# Patient Record
Sex: MALE | Race: BLACK OR AFRICAN AMERICAN | NOT HISPANIC OR LATINO
[De-identification: names, ages, dates, MRNs, and addresses within clinical notes are randomized per-mention and may not be internally consistent; named-entity substitution may affect disease eponyms.]

---

## 2019-04-04 ENCOUNTER — FORM ENCOUNTER (OUTPATIENT)
Age: 54
End: 2019-04-04

## 2019-04-04 ENCOUNTER — EMERGENCY (EMERGENCY)
Facility: HOSPITAL | Age: 54
LOS: 1 days | Discharge: HOME | End: 2019-04-04
Attending: EMERGENCY MEDICINE | Admitting: EMERGENCY MEDICINE
Payer: MEDICAID

## 2019-04-04 VITALS
SYSTOLIC BLOOD PRESSURE: 120 MMHG | HEART RATE: 80 BPM | DIASTOLIC BLOOD PRESSURE: 84 MMHG | RESPIRATION RATE: 18 BRPM | OXYGEN SATURATION: 98 % | TEMPERATURE: 98 F | WEIGHT: 154.98 LBS | HEIGHT: 71 IN

## 2019-04-04 DIAGNOSIS — M54.9 DORSALGIA, UNSPECIFIED: ICD-10-CM

## 2019-04-04 DIAGNOSIS — Z98.890 OTHER SPECIFIED POSTPROCEDURAL STATES: Chronic | ICD-10-CM

## 2019-04-04 DIAGNOSIS — M79.662 PAIN IN LEFT LOWER LEG: ICD-10-CM

## 2019-04-04 DIAGNOSIS — R61 GENERALIZED HYPERHIDROSIS: ICD-10-CM

## 2019-04-04 DIAGNOSIS — F17.210 NICOTINE DEPENDENCE, CIGARETTES, UNCOMPLICATED: ICD-10-CM

## 2019-04-04 DIAGNOSIS — R20.2 PARESTHESIA OF SKIN: ICD-10-CM

## 2019-04-04 DIAGNOSIS — M54.5 LOW BACK PAIN: ICD-10-CM

## 2019-04-04 LAB
ALBUMIN SERPL ELPH-MCNC: 4.2 G/DL — SIGNIFICANT CHANGE UP (ref 3.3–5)
ALP SERPL-CCNC: 47 U/L — SIGNIFICANT CHANGE UP (ref 40–120)
ALT FLD-CCNC: 11 U/L — SIGNIFICANT CHANGE UP (ref 10–45)
ANION GAP SERPL CALC-SCNC: 10 MMOL/L — SIGNIFICANT CHANGE UP (ref 5–17)
AST SERPL-CCNC: 18 U/L — SIGNIFICANT CHANGE UP (ref 10–40)
BASOPHILS # BLD AUTO: 0 K/UL — SIGNIFICANT CHANGE UP (ref 0–0.2)
BASOPHILS NFR BLD AUTO: 0 % — SIGNIFICANT CHANGE UP (ref 0–2)
BILIRUB SERPL-MCNC: 0.7 MG/DL — SIGNIFICANT CHANGE UP (ref 0.2–1.2)
BUN SERPL-MCNC: 9 MG/DL — SIGNIFICANT CHANGE UP (ref 7–23)
CALCIUM SERPL-MCNC: 9.4 MG/DL — SIGNIFICANT CHANGE UP (ref 8.4–10.5)
CHLORIDE SERPL-SCNC: 104 MMOL/L — SIGNIFICANT CHANGE UP (ref 96–108)
CO2 SERPL-SCNC: 24 MMOL/L — SIGNIFICANT CHANGE UP (ref 22–31)
CREAT SERPL-MCNC: 0.66 MG/DL — SIGNIFICANT CHANGE UP (ref 0.5–1.3)
EOSINOPHIL # BLD AUTO: 0.39 K/UL — SIGNIFICANT CHANGE UP (ref 0–0.5)
EOSINOPHIL NFR BLD AUTO: 4.6 % — SIGNIFICANT CHANGE UP (ref 0–6)
GLUCOSE SERPL-MCNC: 113 MG/DL — HIGH (ref 70–99)
HBA1C BLD-MCNC: 4.6 % — SIGNIFICANT CHANGE UP (ref 4–5.6)
HCT VFR BLD CALC: 40.5 % — SIGNIFICANT CHANGE UP (ref 39–50)
HGB BLD-MCNC: 12.9 G/DL — LOW (ref 13–17)
LYMPHOCYTES # BLD AUTO: 3.6 K/UL — HIGH (ref 1–3.3)
LYMPHOCYTES # BLD AUTO: 42.7 % — SIGNIFICANT CHANGE UP (ref 13–44)
MAGNESIUM SERPL-MCNC: 1.8 MG/DL — SIGNIFICANT CHANGE UP (ref 1.6–2.6)
MCHC RBC-ENTMCNC: 31.9 GM/DL — LOW (ref 32–36)
MCHC RBC-ENTMCNC: 32 PG — SIGNIFICANT CHANGE UP (ref 27–34)
MCV RBC AUTO: 100.5 FL — HIGH (ref 80–100)
MONOCYTES # BLD AUTO: 1 K/UL — HIGH (ref 0–0.9)
MONOCYTES NFR BLD AUTO: 11.8 % — SIGNIFICANT CHANGE UP (ref 2–14)
NEUTROPHILS # BLD AUTO: 3.06 K/UL — SIGNIFICANT CHANGE UP (ref 1.8–7.4)
NEUTROPHILS NFR BLD AUTO: 34.5 % — LOW (ref 43–77)
PHOSPHATE SERPL-MCNC: 3.9 MG/DL — SIGNIFICANT CHANGE UP (ref 2.5–4.5)
PLATELET # BLD AUTO: 216 K/UL — SIGNIFICANT CHANGE UP (ref 150–400)
POTASSIUM SERPL-MCNC: 4.2 MMOL/L — SIGNIFICANT CHANGE UP (ref 3.5–5.3)
POTASSIUM SERPL-SCNC: 4.2 MMOL/L — SIGNIFICANT CHANGE UP (ref 3.5–5.3)
PROT SERPL-MCNC: 7.3 G/DL — SIGNIFICANT CHANGE UP (ref 6–8.3)
RBC # BLD: 4.03 M/UL — LOW (ref 4.2–5.8)
RBC # FLD: 12.1 % — SIGNIFICANT CHANGE UP (ref 10.3–14.5)
SODIUM SERPL-SCNC: 138 MMOL/L — SIGNIFICANT CHANGE UP (ref 135–145)
WBC # BLD: 8.44 K/UL — SIGNIFICANT CHANGE UP (ref 3.8–10.5)
WBC # FLD AUTO: 8.44 K/UL — SIGNIFICANT CHANGE UP (ref 3.8–10.5)

## 2019-04-04 PROCEDURE — 84100 ASSAY OF PHOSPHORUS: CPT

## 2019-04-04 PROCEDURE — 83036 HEMOGLOBIN GLYCOSYLATED A1C: CPT

## 2019-04-04 PROCEDURE — 72100 X-RAY EXAM L-S SPINE 2/3 VWS: CPT | Mod: 26

## 2019-04-04 PROCEDURE — 99284 EMERGENCY DEPT VISIT MOD MDM: CPT

## 2019-04-04 PROCEDURE — 99284 EMERGENCY DEPT VISIT MOD MDM: CPT | Mod: 25

## 2019-04-04 PROCEDURE — 72100 X-RAY EXAM L-S SPINE 2/3 VWS: CPT

## 2019-04-04 PROCEDURE — 85025 COMPLETE CBC W/AUTO DIFF WBC: CPT

## 2019-04-04 PROCEDURE — 93971 EXTREMITY STUDY: CPT

## 2019-04-04 PROCEDURE — 36415 COLL VENOUS BLD VENIPUNCTURE: CPT

## 2019-04-04 PROCEDURE — 93971 EXTREMITY STUDY: CPT | Mod: 26,LT

## 2019-04-04 PROCEDURE — 96374 THER/PROPH/DIAG INJ IV PUSH: CPT

## 2019-04-04 PROCEDURE — 83735 ASSAY OF MAGNESIUM: CPT

## 2019-04-04 PROCEDURE — 80053 COMPREHEN METABOLIC PANEL: CPT

## 2019-04-04 RX ORDER — LIDOCAINE 4 G/100G
1 CREAM TOPICAL
Qty: 1 | Refills: 0
Start: 2019-04-04 | End: 2019-05-03

## 2019-04-04 RX ORDER — DIAZEPAM 5 MG
5 TABLET ORAL ONCE
Refills: 0 | Status: DISCONTINUED | OUTPATIENT
Start: 2019-04-04 | End: 2019-04-04

## 2019-04-04 RX ORDER — KETOROLAC TROMETHAMINE 30 MG/ML
30 SYRINGE (ML) INJECTION ONCE
Refills: 0 | Status: DISCONTINUED | OUTPATIENT
Start: 2019-04-04 | End: 2019-04-04

## 2019-04-04 RX ADMIN — Medication 30 MILLIGRAM(S): at 21:01

## 2019-04-04 RX ADMIN — Medication 5 MILLIGRAM(S): at 21:01

## 2019-04-04 NOTE — ED PROVIDER NOTE - PHYSICAL EXAMINATION
Constitutional: sitting in chair; NAD  Head: NC/AT  Eyes: PERRL, EOMI, clear conjunctiva  ENT: MMM  Neck: supple; no JVD or thyromegaly  Respiratory: CTA B/L; no W/R/R, no retractions  Cardiac: +S1/S2; RRR; no M/R/G  Gastrointestinal: soft, NT/ND  Back: spine midline, mild TTP left lumbar paraspinal region  Extremities: WWP  Musculoskeletal: NROM x4; no joint swelling, tenderness or erythema  Neurologic: AAOx3; CNII-XII grossly intact; no focal deficits  Psychiatric: affect and characteristics of appearance, verbalizations, behaviors are appropriate

## 2019-04-04 NOTE — ED PROVIDER NOTE - PROGRESS NOTE DETAILS
IV Toradol and PO Valium given for LLE pain 2/2 sciatica, awaiting LS xray, will obtain LE doppler to r/o DVT due to leg swelling Pain improved with toradol and valium. Xray Pain improved with toradol and valium. Xray   LE dopplers negative for DVT.

## 2019-04-04 NOTE — ED ADULT TRIAGE NOTE - ARRIVAL INFO ADDITIONAL COMMENTS
c.o pain and swelling to left hip radiating down to left buttocks and leg for 4 days. denies any injuries but states he has hx of sciatica. States he took two tabs of Tylenol at 4pm with no relief.

## 2019-04-04 NOTE — ED ADULT NURSE NOTE - OBJECTIVE STATEMENT
Patient presents AOX4 c/o of L LE pain x several days--denies injury/trauma--+DIAL, +weight bearing. Denies numbness/tingling. No obvious deformity noted. Denies urinary/bowel incontinence.

## 2019-04-04 NOTE — ED PROVIDER NOTE - NSFOLLOWUPINSTRUCTIONS_ED_ALL_ED_FT
You came in with L leg pain likely due worsening sciatica. Your blood work was normal. Your low back xrays did not show any fracture. You had a sonogram of your left leg and it did not show any blood clots. Your pain improved with pain medications.     Please take the muscle relaxant as prescribed and followup with your primary care doctor in 1 week. We recommend you see the PMD and get a referral to the physical therapist to help with your pain.

## 2019-04-04 NOTE — ED PROVIDER NOTE - CARE PROVIDER_API CALL
Anshul Park)  Orthopedics  [address] [address]  Phone: [phone]  Fax: [phone]  Follow Up Time: Routine

## 2019-04-04 NOTE — ED PROVIDER NOTE - ATTENDING CONTRIBUTION TO CARE
54 yo male with pmh of sciatica with left leg pain x 1 week - no hx of heavy lifting or trauma--no sob or chest pain also painful walking- pain in left buttock radiating down LLE tingling no foot weakness- no rectal pain or dc or bleeding -no skin lesions/ induration or warmth on buttock-  DTRs  2+ R=L no foot drop  nl pulses  LS XRay-  no compression fx- some straightening  likely sciatica given prior hx--no signs of buttock abscess or induration or edema- doppler LLE- no DVT noted LLE  rec lido patch, muscle relaxants, rest  ortho spine re-eval in 3-5 days for re- eval-

## 2019-04-04 NOTE — ED ADULT NURSE NOTE - NSIMPLEMENTINTERV_GEN_ALL_ED
Implemented All Universal Safety Interventions:  [place] to call system. Call bell, personal items and telephone within reach. Instruct patient to call for assistance. Room bathroom lighting operational. Non-slip footwear when patient is off stretcher. Physically safe environment: no spills, clutter or unnecessary equipment. Stretcher in lowest position, wheels locked, appropriate side rails in place.

## 2019-04-04 NOTE — ED PROVIDER NOTE - CLINICAL SUMMARY MEDICAL DECISION MAKING FREE TEXT BOX
Patient with acute on chronic pain of left lower extremity likely due to sciatica. Pain improved with IV Toradol and valium. Xrays w/out any acute findings. LLE doppler Patient with acute on chronic pain of left lower extremity likely due to sciatica. Pain improved with IV Toradol and valium. Xrays w/out any acute findings. LLE doppler negative for DVT. Pt likely due to worsening sciatica. Prescribed muscle relaxant and recommended to followup with PMD for PT referral.

## 2019-04-04 NOTE — ED PROVIDER NOTE - OBJECTIVE STATEMENT
53M w/sciatica 53M, current every day smoker w/out PMH presenting with L-sided back pain. Pt reports he has chronic LBP radiating down his L leg that he usually takes Tylenol or Aleve for daily. Yesterday was an active day for him and he traveled from his home to [facility] and [facility] causing worsening of his LE pain. He reports swelling of his L buttock, L knee, down to his ankle. No trauma or injury to area, denies any weakness or altered sensation.   However, wife reports she noted a "warm" mass no his L buttock which she noticed yesterday. She also reports patient sweat through the sheets overnight last night and a few nights ago. He denies fevers, chills, cough, sob, cp, urinary symptoms, nvd, constipation, changes in bowel habits. 53M, current every day smoker w/PMH of sciatica presenting with L-sided back pain. Pt reports he has chronic LBP radiating down his L leg that he usually takes Tylenol or Aleve for daily. Yesterday was an active day for him and he traveled from his home to [facility] and back causing worsening of his LE pain. He reports swelling of his L buttock, L knee, down to his ankle. No trauma or injury to area, denies any weakness or altered sensation.   However, wife reports she noted a "warm" mass no his L buttock which she noticed yesterday. She also reports patient sweat through the sheets overnight last night and a few nights ago. He denies fevers, chills, cough, sob, cp, urinary symptoms, nvd, constipation, changes in bowel habits.

## 2019-04-05 ENCOUNTER — OUTPATIENT (OUTPATIENT)
Dept: OUTPATIENT SERVICES | Facility: HOSPITAL | Age: 54
LOS: 1 days | End: 2019-04-05
Payer: MEDICAID

## 2019-04-05 ENCOUNTER — RECORD ABSTRACTING (OUTPATIENT)
Age: 54
End: 2019-04-05

## 2019-04-05 ENCOUNTER — APPOINTMENT (OUTPATIENT)
Dept: ORTHOPEDIC SURGERY | Facility: HOSPITAL | Age: 54
End: 2019-04-05
Payer: MEDICAID

## 2019-04-05 VITALS
DIASTOLIC BLOOD PRESSURE: 80 MMHG | BODY MASS INDEX: 21 KG/M2 | HEIGHT: 71 IN | WEIGHT: 150 LBS | SYSTOLIC BLOOD PRESSURE: 130 MMHG

## 2019-04-05 DIAGNOSIS — M51.36 OTHER INTERVERTEBRAL DISC DEGENERATION, LUMBAR REGION: ICD-10-CM

## 2019-04-05 DIAGNOSIS — F17.200 NICOTINE DEPENDENCE, UNSPECIFIED, UNCOMPLICATED: ICD-10-CM

## 2019-04-05 DIAGNOSIS — M54.42 LUMBAGO WITH SCIATICA, LEFT SIDE: ICD-10-CM

## 2019-04-05 DIAGNOSIS — Z98.890 OTHER SPECIFIED POSTPROCEDURAL STATES: Chronic | ICD-10-CM

## 2019-04-05 DIAGNOSIS — G89.29 LUMBAGO WITH SCIATICA, LEFT SIDE: ICD-10-CM

## 2019-04-05 DIAGNOSIS — Z72.3 LACK OF PHYSICAL EXERCISE: ICD-10-CM

## 2019-04-05 DIAGNOSIS — Z78.9 OTHER SPECIFIED HEALTH STATUS: ICD-10-CM

## 2019-04-05 PROBLEM — Z00.00 ENCOUNTER FOR PREVENTIVE HEALTH EXAMINATION: Status: ACTIVE | Noted: 2019-04-05

## 2019-04-05 PROCEDURE — 72084 X-RAY EXAM ENTIRE SPI 6/> VW: CPT

## 2019-04-05 PROCEDURE — 72082 X-RAY EXAM ENTIRE SPI 2/3 VW: CPT

## 2019-04-05 PROCEDURE — 99204 OFFICE O/P NEW MOD 45 MIN: CPT

## 2019-04-05 PROCEDURE — 72100 X-RAY EXAM L-S SPINE 2/3 VWS: CPT

## 2019-04-05 PROCEDURE — 72084 X-RAY EXAM ENTIRE SPI 6/> VW: CPT | Mod: 26

## 2019-04-25 PROBLEM — Z72.3 DOES NOT EXERCISE: Status: ACTIVE | Noted: 2019-04-25

## 2019-04-25 PROBLEM — Z78.9 SOCIAL ALCOHOL USE: Status: ACTIVE | Noted: 2019-04-25

## 2019-04-25 PROBLEM — M51.36 DISC DEGENERATION, LUMBAR: Status: ACTIVE | Noted: 2019-04-25

## 2019-04-25 PROBLEM — F17.200 CURRENT EVERY DAY SMOKER: Status: ACTIVE | Noted: 2019-04-25

## 2019-04-25 PROBLEM — M54.42 CHRONIC BILATERAL LOW BACK PAIN WITH LEFT-SIDED SCIATICA: Status: ACTIVE | Noted: 2019-04-05

## 2019-04-28 NOTE — PHYSICAL EXAM
[de-identified] : General: patient is well developed, well nourished, in no acute \par distress, alert and oriented x 3. \par \par Mood and affect: normal\par \par Respiratory: no respiratory distress noted\par \par Skin: no scars over spine, skin intact, no erythema, increased warmth\par \par Alignment:The spine is well compensated in the coronal and sagittal plane.  There is no asymmetry on the ang forward bend test\par \par Gait: The patient is able to toe walk and heel walk without difficulty. The patient is able to tandem gait without difficulty.\par \par Palpation: no tenderness to palpation spine or paraspinal region\par \par Range of motion: Lumbar spine ROM is restricted\par \par Neurologic Exam:\par Motor: Manual Muscle testing in the lower extremities is 5 out of 5 in all muscle groups. There is no evidence of muscular atrophy in the lower extremities. Sensory: Sensation to light touch is grossly intact in the lower extremities\par \par Reflexes: DTR are present and symmetric throughout, negative almaraz bilaterally, negative inverted radial reflex bilaterally, no clonus, plantar responses are flexor\par \par Hip Exam: No pain with internal or external rotation of hips bilaterally\par \par Special tests: Straight leg raise test negative.  Cross straight leg test negative.  ANNA test negative\par \par Vascular: Examination of the peripheral vascular system demonstrates no evidence of congestion or edema. no evidence of lymphedema bilateral lower extremities, pulses are present and symmetric in both lower extremities.\par \par  [de-identified] : XR thoracolumbar 4/5/19: multilevel degenerative changes, most pronounced at L2/3, no spondylolisthesis or dynamic instability, mild levoscoliosis, no acute fractures

## 2019-04-28 NOTE — HISTORY OF PRESENT ILLNESS
[de-identified] : Referred by ED\par \par Mr. HUBBARD is a very pleasant 53 year old male who complains of low back pain for years, worse over the past month. On initial presentation symptoms were as follows: Patient described the pain as constant.  Patient rated the pain as 6/10 today, average 10/10 this week, 10/10 at its worst.  The pain localized to lower back.  The pain radiates posteriorly down his right leg to his foot.  Patient  denies extremity numbness and paresthesias. The pain is relieved by nothing in particular.  The pain is worsened by activity. Past treatments included pharmacologic management, with mild temporary relief. The patient has not had physical therapy or steroid injections. Pain was so severe that he was seen in ED 4/1/19, pain managed and released.\par \par The patient reports no loss of hand dexterity.\par The patient states there no loss of balance when walking.\par There no sensory loss in the arms or legs\par The patent no difficulty with urination.\par \par The patient no history of previous spine surgery.\par The patient no previous spine consultation.\par \par Pain:\par Back 50 Right Leg 0 Left Leg 50\par \par The patient has no history of unexpected weight loss, no history of active cancer, no history bladder or bowel dysfunction, no night pain, no fevers or chills.\par \par The past medical history, surgical history, family history, allergies, medications, 10+ point review of systems, family history and social history were reviewed and non contributory.\par \par

## 2019-04-28 NOTE — DISCUSSION/SUMMARY
[de-identified] : Discussed the results of the patient's history, physical exam, and imaging. Mr. Butcher has been suffering from low back pain that radiates posteriorly down his left leg for years, worsening over the past month. Physical and neurologic exams are normal. I am recommending an MRI lumbar without contrast for further evaluation. The patient will follow up with me after imaging is completed, sooner if there is an issue.  All questions were answered.\par \par

## 2019-05-12 ENCOUNTER — EMERGENCY (EMERGENCY)
Facility: HOSPITAL | Age: 54
LOS: 1 days | Discharge: HOME | End: 2019-05-12
Attending: EMERGENCY MEDICINE | Admitting: EMERGENCY MEDICINE
Payer: MEDICAID

## 2019-05-12 VITALS
HEART RATE: 80 BPM | OXYGEN SATURATION: 98 % | RESPIRATION RATE: 17 BRPM | TEMPERATURE: 98 F | DIASTOLIC BLOOD PRESSURE: 89 MMHG | SYSTOLIC BLOOD PRESSURE: 176 MMHG

## 2019-05-12 DIAGNOSIS — Z98.890 OTHER SPECIFIED POSTPROCEDURAL STATES: Chronic | ICD-10-CM

## 2019-05-12 DIAGNOSIS — R10.12 LEFT UPPER QUADRANT PAIN: ICD-10-CM

## 2019-05-12 DIAGNOSIS — R10.9 UNSPECIFIED ABDOMINAL PAIN: ICD-10-CM

## 2019-05-12 DIAGNOSIS — Z79.899 OTHER LONG TERM (CURRENT) DRUG THERAPY: ICD-10-CM

## 2019-05-12 LAB
BASOPHILS # BLD AUTO: 0.03 K/UL — SIGNIFICANT CHANGE UP (ref 0–0.2)
BASOPHILS NFR BLD AUTO: 0.3 % — SIGNIFICANT CHANGE UP (ref 0–2)
EOSINOPHIL # BLD AUTO: 0.2 K/UL — SIGNIFICANT CHANGE UP (ref 0–0.5)
EOSINOPHIL NFR BLD AUTO: 2 % — SIGNIFICANT CHANGE UP (ref 0–6)
EXTRA BLUE TOP TUBE: SIGNIFICANT CHANGE UP
EXTRA SST TUBE: SIGNIFICANT CHANGE UP
HCT VFR BLD CALC: 39.9 % — SIGNIFICANT CHANGE UP (ref 39–50)
HGB BLD-MCNC: 12.7 G/DL — LOW (ref 13–17)
IMM GRANULOCYTES NFR BLD AUTO: 0.2 % — SIGNIFICANT CHANGE UP (ref 0–1.5)
LYMPHOCYTES # BLD AUTO: 4.13 K/UL — HIGH (ref 1–3.3)
LYMPHOCYTES # BLD AUTO: 41.7 % — SIGNIFICANT CHANGE UP (ref 13–44)
MCHC RBC-ENTMCNC: 31.8 GM/DL — LOW (ref 32–36)
MCHC RBC-ENTMCNC: 31.9 PG — SIGNIFICANT CHANGE UP (ref 27–34)
MCV RBC AUTO: 100.3 FL — HIGH (ref 80–100)
MONOCYTES # BLD AUTO: 1.1 K/UL — HIGH (ref 0–0.9)
MONOCYTES NFR BLD AUTO: 11.1 % — SIGNIFICANT CHANGE UP (ref 2–14)
NEUTROPHILS # BLD AUTO: 4.43 K/UL — SIGNIFICANT CHANGE UP (ref 1.8–7.4)
NEUTROPHILS NFR BLD AUTO: 44.7 % — SIGNIFICANT CHANGE UP (ref 43–77)
NRBC # BLD: 0 /100 WBCS — SIGNIFICANT CHANGE UP (ref 0–0)
PLATELET # BLD AUTO: 264 K/UL — SIGNIFICANT CHANGE UP (ref 150–400)
RBC # BLD: 3.98 M/UL — LOW (ref 4.2–5.8)
RBC # FLD: 12 % — SIGNIFICANT CHANGE UP (ref 10.3–14.5)
WBC # BLD: 9.91 K/UL — SIGNIFICANT CHANGE UP (ref 3.8–10.5)
WBC # FLD AUTO: 9.91 K/UL — SIGNIFICANT CHANGE UP (ref 3.8–10.5)

## 2019-05-12 PROCEDURE — 99284 EMERGENCY DEPT VISIT MOD MDM: CPT | Mod: 25

## 2019-05-12 NOTE — ED ADULT NURSE NOTE - OBJECTIVE STATEMENT
pt aaox3 c/o LUQ abdominal pain that started 3 days ago but then went away and came back. Pt denies NVD states "it might be a pulled muscle this has happened before"     pt in nad but tender to palpation. Labs sent IV line established. MD mustafa Pending WCTM

## 2019-05-13 VITALS
OXYGEN SATURATION: 98 % | TEMPERATURE: 97 F | HEART RATE: 88 BPM | RESPIRATION RATE: 17 BRPM | DIASTOLIC BLOOD PRESSURE: 80 MMHG | SYSTOLIC BLOOD PRESSURE: 171 MMHG

## 2019-05-13 LAB
ALBUMIN SERPL ELPH-MCNC: 4.3 G/DL — SIGNIFICANT CHANGE UP (ref 3.3–5)
ALP SERPL-CCNC: 52 U/L — SIGNIFICANT CHANGE UP (ref 40–120)
ALT FLD-CCNC: 7 U/L — LOW (ref 10–45)
ANION GAP SERPL CALC-SCNC: 11 MMOL/L — SIGNIFICANT CHANGE UP (ref 5–17)
AST SERPL-CCNC: 16 U/L — SIGNIFICANT CHANGE UP (ref 10–40)
BILIRUB SERPL-MCNC: 0.7 MG/DL — SIGNIFICANT CHANGE UP (ref 0.2–1.2)
BUN SERPL-MCNC: 10 MG/DL — SIGNIFICANT CHANGE UP (ref 7–23)
CALCIUM SERPL-MCNC: 9.1 MG/DL — SIGNIFICANT CHANGE UP (ref 8.4–10.5)
CHLORIDE SERPL-SCNC: 107 MMOL/L — SIGNIFICANT CHANGE UP (ref 96–108)
CK SERPL-CCNC: 287 U/L — HIGH (ref 30–200)
CO2 SERPL-SCNC: 25 MMOL/L — SIGNIFICANT CHANGE UP (ref 22–31)
CREAT SERPL-MCNC: 0.66 MG/DL — SIGNIFICANT CHANGE UP (ref 0.5–1.3)
GLUCOSE SERPL-MCNC: 111 MG/DL — HIGH (ref 70–99)
LIDOCAIN IGE QN: 16 U/L — SIGNIFICANT CHANGE UP (ref 7–60)
POTASSIUM SERPL-MCNC: 4 MMOL/L — SIGNIFICANT CHANGE UP (ref 3.5–5.3)
POTASSIUM SERPL-SCNC: 4 MMOL/L — SIGNIFICANT CHANGE UP (ref 3.5–5.3)
PROT SERPL-MCNC: 7.3 G/DL — SIGNIFICANT CHANGE UP (ref 6–8.3)
SODIUM SERPL-SCNC: 143 MMOL/L — SIGNIFICANT CHANGE UP (ref 135–145)

## 2019-05-13 PROCEDURE — 80053 COMPREHEN METABOLIC PANEL: CPT

## 2019-05-13 PROCEDURE — 87389 HIV-1 AG W/HIV-1&-2 AB AG IA: CPT

## 2019-05-13 PROCEDURE — 36415 COLL VENOUS BLD VENIPUNCTURE: CPT

## 2019-05-13 PROCEDURE — 85025 COMPLETE CBC W/AUTO DIFF WBC: CPT

## 2019-05-13 PROCEDURE — 74177 CT ABD & PELVIS W/CONTRAST: CPT

## 2019-05-13 PROCEDURE — 99284 EMERGENCY DEPT VISIT MOD MDM: CPT | Mod: 25

## 2019-05-13 PROCEDURE — 96374 THER/PROPH/DIAG INJ IV PUSH: CPT | Mod: XU

## 2019-05-13 PROCEDURE — 74177 CT ABD & PELVIS W/CONTRAST: CPT | Mod: 26

## 2019-05-13 PROCEDURE — 96375 TX/PRO/DX INJ NEW DRUG ADDON: CPT

## 2019-05-13 PROCEDURE — 83690 ASSAY OF LIPASE: CPT

## 2019-05-13 PROCEDURE — 82550 ASSAY OF CK (CPK): CPT

## 2019-05-13 RX ORDER — MORPHINE SULFATE 50 MG/1
4 CAPSULE, EXTENDED RELEASE ORAL ONCE
Refills: 0 | Status: DISCONTINUED | OUTPATIENT
Start: 2019-05-13 | End: 2019-05-13

## 2019-05-13 RX ORDER — KETOROLAC TROMETHAMINE 30 MG/ML
30 SYRINGE (ML) INJECTION ONCE
Refills: 0 | Status: DISCONTINUED | OUTPATIENT
Start: 2019-05-13 | End: 2019-05-13

## 2019-05-13 RX ADMIN — MORPHINE SULFATE 4 MILLIGRAM(S): 50 CAPSULE, EXTENDED RELEASE ORAL at 00:46

## 2019-05-13 RX ADMIN — MORPHINE SULFATE 4 MILLIGRAM(S): 50 CAPSULE, EXTENDED RELEASE ORAL at 00:24

## 2019-05-13 RX ADMIN — Medication 30 MILLIGRAM(S): at 00:24

## 2019-05-13 RX ADMIN — Medication 30 MILLIGRAM(S): at 00:45

## 2019-05-13 NOTE — ED PROVIDER NOTE - NSFOLLOWUPINSTRUCTIONS_ED_ALL_ED_FT
Muscle Strain  A muscle strain is an injury that occurs when a muscle is stretched beyond its normal length. Usually, a small number of muscle fibers are torn when this happens. There are three types of muscle strains. First-degree strains have the least amount of muscle fiber tearing and the least amount of pain. Second-degree and third-degree strains have more tearing and pain.    Usually, recovery from muscle strain takes 1–2 weeks. Complete healing normally takes 5–6 weeks.    What are the causes?  This condition is caused when a sudden, violent force is placed on a muscle and stretches it too far. This may occur with a fall, lifting, or sports.    What increases the risk?  This condition is more likely to develop in athletes and people who are physically active.    What are the signs or symptoms?  Symptoms of this condition include:  Pain.  Bruising.  Swelling.  Trouble using the muscle.  How is this diagnosed?  This condition is diagnosed based on a physical exam and your medical history. Tests may also be done, including an X-ray, ultrasound, or MRI.    How is this treated?  This condition is initially treated with PRICE therapy. This therapy involves:  Protecting the muscle from being injured again.  Resting the injured muscle.  Icing the injured muscle.  Applying pressure (compression) to the injured muscle. This may be done with a splint or elastic bandage.  Raising (elevating) the injured muscle.  Your health care provider may also recommend medicine for pain.    Follow these instructions at home:  If you have a splint:     Wear the splint as told by your health care provider. Remove it only as told by your health care provider.   Loosen the splint if your fingers or toes tingle, become numb, or turn cold and blue.  Keep the splint clean.  If the splint is not waterproof:  Do not let it get wet.  Cover it with a watertight covering when you take a bath or a shower.  Managing pain, stiffness, and swelling     Image   If directed, put ice on the injured area.  If you have a removable splint, remove it as told by your health care provider.  Put ice in a plastic bag.  Place a towel between your skin and the bag.  Leave the ice on for 20 minutes, 2–3 times a day.  Move your fingers or toes often to avoid stiffness and to lessen swelling.  Raise (elevate) the injured area above the level of your heart while you are sitting or lying down.  Wear an elastic bandage as told by your health care provider. Make sure that it is not too tight.  General instructions     Take over-the-counter and prescription medicines only as told by your health care provider.  Restrict your activity and rest the injured muscle as told by your health care provider. Gentle movements may be allowed.  If physical therapy was prescribed, do exercises as told by your health care provider.  Do not put pressure on any part of the splint until it is fully hardened. This may take several hours.  Do not use any products that contain nicotine or tobacco, such as cigarettes and e-cigarettes. These can delay bone healing. If you need help quitting, ask your health care provider.  Ask your health care provider when it is safe to drive if you have a splint.  Keep all follow-up visits as told by your health care provider. This is important.  How is this prevented?  Warm up before exercising. This helps to prevent future muscle strains.  Contact a health care provider if:  You have more pain or swelling in the injured area.  Get help right away if:  You have numbness or tingling or lose a lot of strength in the injured area.  Summary  A muscle strain is an injury that occurs when a muscle is stretched beyond its normal length.  This condition is caused when a sudden, violent force is placed on a muscle and stretches it too far.  This condition is initially treated with PRICE therapy, which involves protecting, resting, icing, compressing, and elevating.  Gentle movements may be allowed. If physical therapy was prescribed, do exercises as told by your health care provider.

## 2019-05-13 NOTE — ED PROVIDER NOTE - OBJECTIVE STATEMENT
55yo M hx of sciatica, here for eval of new LUQ abdominal pain x4 days. Started after heavy pushing at work, states works as a  but does a lot of lifting. Pain initially improved then worsened again. worse w/ palpation. never had this before. no associated n/v/d. also w/ his sciatica symptoms but this has been ongoing stable and chronic, has not yet seen pcp for this. no urinary symptoms, no radiation of pain into chest, back, or groin.

## 2019-05-13 NOTE — ED PROVIDER NOTE - CLINICAL SUMMARY MEDICAL DECISION MAKING FREE TEXT BOX
here w/ LUQ pain and tenderness, MSK vs splenic injury given that pt was doing heavy liftig when it started. will check ct abdomen/pelvis w/ IV contrast to assess. if neg, to dc home to f/u pcp. pt and family happy with this plan.

## 2019-07-09 ENCOUNTER — EMERGENCY (EMERGENCY)
Facility: HOSPITAL | Age: 54
LOS: 1 days | Discharge: HOME | End: 2019-07-09
Attending: EMERGENCY MEDICINE | Admitting: EMERGENCY MEDICINE
Payer: MEDICAID

## 2019-07-09 VITALS
TEMPERATURE: 98 F | DIASTOLIC BLOOD PRESSURE: 72 MMHG | OXYGEN SATURATION: 98 % | RESPIRATION RATE: 16 BRPM | SYSTOLIC BLOOD PRESSURE: 139 MMHG | HEART RATE: 72 BPM

## 2019-07-09 VITALS
RESPIRATION RATE: 16 BRPM | SYSTOLIC BLOOD PRESSURE: 186 MMHG | DIASTOLIC BLOOD PRESSURE: 89 MMHG | TEMPERATURE: 98 F | OXYGEN SATURATION: 98 % | HEART RATE: 68 BPM | HEIGHT: 71 IN | WEIGHT: 160.06 LBS

## 2019-07-09 DIAGNOSIS — Z98.890 OTHER SPECIFIED POSTPROCEDURAL STATES: Chronic | ICD-10-CM

## 2019-07-09 DIAGNOSIS — R10.32 LEFT LOWER QUADRANT PAIN: ICD-10-CM

## 2019-07-09 DIAGNOSIS — K52.82 EOSINOPHILIC COLITIS: ICD-10-CM

## 2019-07-09 LAB
ALBUMIN SERPL ELPH-MCNC: 4.5 G/DL — SIGNIFICANT CHANGE UP (ref 3.3–5)
ALP SERPL-CCNC: 55 U/L — SIGNIFICANT CHANGE UP (ref 40–120)
ALT FLD-CCNC: 11 U/L — SIGNIFICANT CHANGE UP (ref 10–45)
ANION GAP SERPL CALC-SCNC: 12 MMOL/L — SIGNIFICANT CHANGE UP (ref 5–17)
APPEARANCE UR: CLEAR — SIGNIFICANT CHANGE UP
AST SERPL-CCNC: 16 U/L — SIGNIFICANT CHANGE UP (ref 10–40)
BASOPHILS # BLD AUTO: 0.03 K/UL — SIGNIFICANT CHANGE UP (ref 0–0.2)
BASOPHILS NFR BLD AUTO: 0.4 % — SIGNIFICANT CHANGE UP (ref 0–2)
BILIRUB SERPL-MCNC: 1.2 MG/DL — SIGNIFICANT CHANGE UP (ref 0.2–1.2)
BILIRUB UR-MCNC: NEGATIVE — SIGNIFICANT CHANGE UP
BUN SERPL-MCNC: 8 MG/DL — SIGNIFICANT CHANGE UP (ref 7–23)
CALCIUM SERPL-MCNC: 9.2 MG/DL — SIGNIFICANT CHANGE UP (ref 8.4–10.5)
CHLORIDE SERPL-SCNC: 105 MMOL/L — SIGNIFICANT CHANGE UP (ref 96–108)
CO2 SERPL-SCNC: 23 MMOL/L — SIGNIFICANT CHANGE UP (ref 22–31)
COLOR SPEC: YELLOW — SIGNIFICANT CHANGE UP
CREAT SERPL-MCNC: 0.62 MG/DL — SIGNIFICANT CHANGE UP (ref 0.5–1.3)
DIFF PNL FLD: ABNORMAL
EOSINOPHIL # BLD AUTO: 0.03 K/UL — SIGNIFICANT CHANGE UP (ref 0–0.5)
EOSINOPHIL NFR BLD AUTO: 0.4 % — SIGNIFICANT CHANGE UP (ref 0–6)
GLUCOSE SERPL-MCNC: 104 MG/DL — HIGH (ref 70–99)
GLUCOSE UR QL: NEGATIVE — SIGNIFICANT CHANGE UP
HCT VFR BLD CALC: 44.6 % — SIGNIFICANT CHANGE UP (ref 39–50)
HGB BLD-MCNC: 14.1 G/DL — SIGNIFICANT CHANGE UP (ref 13–17)
IMM GRANULOCYTES NFR BLD AUTO: 0.4 % — SIGNIFICANT CHANGE UP (ref 0–1.5)
KETONES UR-MCNC: NEGATIVE — SIGNIFICANT CHANGE UP
LACTATE SERPL-SCNC: 1.3 MMOL/L — SIGNIFICANT CHANGE UP (ref 0.5–2)
LEUKOCYTE ESTERASE UR-ACNC: ABNORMAL
LIDOCAIN IGE QN: 9 U/L — SIGNIFICANT CHANGE UP (ref 7–60)
LYMPHOCYTES # BLD AUTO: 2.1 K/UL — SIGNIFICANT CHANGE UP (ref 1–3.3)
LYMPHOCYTES # BLD AUTO: 26.4 % — SIGNIFICANT CHANGE UP (ref 13–44)
MCHC RBC-ENTMCNC: 31.2 PG — SIGNIFICANT CHANGE UP (ref 27–34)
MCHC RBC-ENTMCNC: 31.6 GM/DL — LOW (ref 32–36)
MCV RBC AUTO: 98.7 FL — SIGNIFICANT CHANGE UP (ref 80–100)
MONOCYTES # BLD AUTO: 0.76 K/UL — SIGNIFICANT CHANGE UP (ref 0–0.9)
MONOCYTES NFR BLD AUTO: 9.5 % — SIGNIFICANT CHANGE UP (ref 2–14)
NEUTROPHILS # BLD AUTO: 5.01 K/UL — SIGNIFICANT CHANGE UP (ref 1.8–7.4)
NEUTROPHILS NFR BLD AUTO: 62.9 % — SIGNIFICANT CHANGE UP (ref 43–77)
NITRITE UR-MCNC: NEGATIVE — SIGNIFICANT CHANGE UP
NRBC # BLD: 0 /100 WBCS — SIGNIFICANT CHANGE UP (ref 0–0)
PH UR: 7 — SIGNIFICANT CHANGE UP (ref 5–8)
PLATELET # BLD AUTO: 253 K/UL — SIGNIFICANT CHANGE UP (ref 150–400)
POTASSIUM SERPL-MCNC: 4.1 MMOL/L — SIGNIFICANT CHANGE UP (ref 3.5–5.3)
POTASSIUM SERPL-SCNC: 4.1 MMOL/L — SIGNIFICANT CHANGE UP (ref 3.5–5.3)
PROT SERPL-MCNC: 8.3 G/DL — SIGNIFICANT CHANGE UP (ref 6–8.3)
PROT UR-MCNC: ABNORMAL MG/DL
RBC # BLD: 4.52 M/UL — SIGNIFICANT CHANGE UP (ref 4.2–5.8)
RBC # FLD: 11.9 % — SIGNIFICANT CHANGE UP (ref 10.3–14.5)
SODIUM SERPL-SCNC: 140 MMOL/L — SIGNIFICANT CHANGE UP (ref 135–145)
SP GR SPEC: 1.01 — SIGNIFICANT CHANGE UP (ref 1–1.03)
UROBILINOGEN FLD QL: 1 E.U./DL — SIGNIFICANT CHANGE UP
WBC # BLD: 7.96 K/UL — SIGNIFICANT CHANGE UP (ref 3.8–10.5)
WBC # FLD AUTO: 7.96 K/UL — SIGNIFICANT CHANGE UP (ref 3.8–10.5)

## 2019-07-09 PROCEDURE — 93005 ELECTROCARDIOGRAM TRACING: CPT

## 2019-07-09 PROCEDURE — 93010 ELECTROCARDIOGRAM REPORT: CPT

## 2019-07-09 PROCEDURE — 83690 ASSAY OF LIPASE: CPT

## 2019-07-09 PROCEDURE — 74177 CT ABD & PELVIS W/CONTRAST: CPT | Mod: 26

## 2019-07-09 PROCEDURE — 74177 CT ABD & PELVIS W/CONTRAST: CPT

## 2019-07-09 PROCEDURE — 85025 COMPLETE CBC W/AUTO DIFF WBC: CPT

## 2019-07-09 PROCEDURE — 99284 EMERGENCY DEPT VISIT MOD MDM: CPT | Mod: 25

## 2019-07-09 PROCEDURE — 80053 COMPREHEN METABOLIC PANEL: CPT

## 2019-07-09 PROCEDURE — 81001 URINALYSIS AUTO W/SCOPE: CPT

## 2019-07-09 PROCEDURE — 99284 EMERGENCY DEPT VISIT MOD MDM: CPT

## 2019-07-09 PROCEDURE — 36415 COLL VENOUS BLD VENIPUNCTURE: CPT

## 2019-07-09 PROCEDURE — 83605 ASSAY OF LACTIC ACID: CPT

## 2019-07-09 RX ORDER — IOHEXOL 300 MG/ML
30 INJECTION, SOLUTION INTRAVENOUS ONCE
Refills: 0 | Status: COMPLETED | OUTPATIENT
Start: 2019-07-09 | End: 2019-07-09

## 2019-07-09 RX ADMIN — IOHEXOL 30 MILLILITER(S): 300 INJECTION, SOLUTION INTRAVENOUS at 15:09

## 2019-07-09 NOTE — ED PROVIDER NOTE - EYES, MLM
Clear bilaterally, pupils equal, round and reactive to light. Clear bilaterally, pupils equal, round

## 2019-07-09 NOTE — ED PROVIDER NOTE - NSFOLLOWUPINSTRUCTIONS_ED_ALL_ED_FT
Log Out.    [facility] CareNotes®     :  [facility]             COLITIS - AfterCare(R) Instructions(ER/ED)     Colitis    WHAT YOU NEED TO KNOW:    Colitis is swelling and irritation of your colon. Colitis may be caused by ulcers or a problem with your immune system. Bacteria, a virus, or a parasite may also cause colitis. The cause may not be known. You may have diarrhea, abdominal pain, fever, or blood or mucus in your bowel movement.    DISCHARGE INSTRUCTIONS:    Return to the emergency department if:     You have sudden trouble breathing.       Your bowel movements are black or have blood in them.      You have blood in your vomit.      You have severe abdominal pain or your abdomen is swollen and feels hard.      You have any of the following signs of dehydration:   Dizziness or weakness      Dry mouth, cracked lips, or severe thirst      Fast heartbeat or breathing      Urinating very little or not at all    Contact your healthcare provider if:     Your symptoms get worse or do not go away.      You have a fever, chills, cough, or feel weak and achy.      You suddenly lose weight without trying.       You have questions or concerns about your condition or care.     Medicines:     Medicines may be given to decrease inflammation in your colon and treat diarrhea.      Take your medicine as directed. Contact your healthcare provider if you think your medicine is not helping or if you have side effects. Tell him of her if you are allergic to any medicine. Keep a list of the medicines, vitamins, and herbs you take. Include the amounts, and when and why you take them. Bring the list or the pill bottles to follow-up visits. Carry your medicine list with you in case of an emergency.    Manage your symptoms:     Drink liquids as directed to help prevent dehydration. Good liquids to drink include water, juice, and broth. Ask how much liquid to drink each day. You may need to drink an oral rehydration solution (ORS). An ORS contains a balance of water, salt, and sugar to replace body fluids lost during diarrhea.       Eat a variety of healthy foods. Healthy foods include fruits, vegetables, whole-grain breads, beans, low-fat dairy products, lean meats, and fish. You may need to eat several small meals throughout the day instead of large meals. Avoid spicy foods, caffeine, chocolate, and foods high in fat.      Talk to your healthcare provider before you take NSAIDs. NSAIDs can cause worsen your symptoms if ulcers are causing your colitis.       Start to exercise when you feel better. Regular exercise helps your bowels work normally. Ask about the best exercise plan for you.    Follow up with your healthcare provider as directed: You may need to return for a colonoscopy or other tests. Write down how often you have a bowel movements and what they look like. Bring this to your follow-up visits. Write down your questions so you remember to ask them during your visits.       © Copyright [employer] 2019 All illustrations and images included in CareNotes are the copyrighted property of [employer] Inc[employer] or [employer].      back to top                      © Copyright [employer] 2019

## 2019-07-09 NOTE — ED PROVIDER NOTE - CLINICAL SUMMARY MEDICAL DECISION MAKING FREE TEXT BOX
Patient with LLQ pain afebrile with normal labs. Ct scan shows proctocolitis with no complication. Recommend f/u with GI.

## 2019-07-09 NOTE — ED PROVIDER NOTE - ATTENDING CONTRIBUTION TO CARE
53 yo M hx of HTN not on medications, p/w LLQ pain since midnight last night associated with n/v/d NBNB emesis and diarrhea >5 episodes now with small streaks of blood.  No hx of diverticulitis.  No fever, chills.  Exam nl lungs and chest.  Focal LLQ ttp and mild L CVAT, epigastric region w/ minimal ttp.  No chest pain or sob.  EKG obtained and nonischemic -  chronic LVH.  HTN on exam but otherwise HD stable.  Lactate, lipase and wbc nl.  Will scan to r/o diverticulitis vs colitis.  Do not suspect cardiac cause, dissection or aaa.  Plan await CT scan.

## 2019-07-09 NOTE — ED PROVIDER NOTE - CARE PROVIDER_API CALL
Anshul Paulson)  Gastroenterology; Internal Medicine  [address], [address]  [place] [address]  Phone: [phone]  Fax: [phone]  Follow Up Time:

## 2019-07-09 NOTE — ED PROVIDER NOTE - OBJECTIVE STATEMENT
55 y/o M with PMHx of HTN that is not being managed with medication presents to the ED with complaints of LLQ pain, nausea, vomiting, and diarrhea since last night. Patient has had >5 episodes diarrhea since, and noted blood streaks in last remaining episodes. Denies any fever, travel, hx of diverticulitis, prior surgical hx, urinary symptoms, testicular pain, or other acute complaints.

## 2019-07-09 NOTE — ED ADULT NURSE REASSESSMENT NOTE - NS ED NURSE REASSESS COMMENT FT1
CT scan done, no abdominal or any new symptom complaint, vital signs stable, fluids PO tolerated well, discharged to home in stable condition.
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
## 2019-07-09 NOTE — ED ADULT NURSE REASSESSMENT NOTE - NS ED NURSE REASSESS COMMENT FT1
Patient c/o of abdominal pain w/ episodes of vomitting and diarrhea, none since arrival to ED.  Vital signs stable. Left FA PIV #20 in place, all labs sent, no complications.  NPO observed. CT scan done; results pending.

## 2019-08-23 ENCOUNTER — EMERGENCY (EMERGENCY)
Facility: HOSPITAL | Age: 54
LOS: 1 days | Discharge: HOME | End: 2019-08-23
Attending: EMERGENCY MEDICINE | Admitting: EMERGENCY MEDICINE
Payer: MEDICAID

## 2019-08-23 VITALS
HEIGHT: 71 IN | RESPIRATION RATE: 18 BRPM | DIASTOLIC BLOOD PRESSURE: 88 MMHG | TEMPERATURE: 98 F | HEART RATE: 72 BPM | OXYGEN SATURATION: 96 % | WEIGHT: 159.61 LBS | SYSTOLIC BLOOD PRESSURE: 137 MMHG

## 2019-08-23 DIAGNOSIS — Z98.890 OTHER SPECIFIED POSTPROCEDURAL STATES: Chronic | ICD-10-CM

## 2019-08-23 PROCEDURE — 29125 APPL SHORT ARM SPLINT STATIC: CPT | Mod: RT

## 2019-08-23 PROCEDURE — 73130 X-RAY EXAM OF HAND: CPT

## 2019-08-23 PROCEDURE — 99284 EMERGENCY DEPT VISIT MOD MDM: CPT | Mod: 25

## 2019-08-23 PROCEDURE — 96372 THER/PROPH/DIAG INJ SC/IM: CPT | Mod: XU

## 2019-08-23 PROCEDURE — 73130 X-RAY EXAM OF HAND: CPT | Mod: 26,RT

## 2019-08-23 PROCEDURE — 26600 TREAT METACARPAL FRACTURE: CPT | Mod: RT

## 2019-08-23 RX ORDER — METHOCARBAMOL 500 MG/1
500 TABLET, FILM COATED ORAL ONCE
Refills: 0 | Status: COMPLETED | OUTPATIENT
Start: 2019-08-23 | End: 2019-08-23

## 2019-08-23 RX ORDER — LIDOCAINE 4 G/100G
1 CREAM TOPICAL ONCE
Refills: 0 | Status: COMPLETED | OUTPATIENT
Start: 2019-08-23 | End: 2019-08-23

## 2019-08-23 RX ORDER — KETOROLAC TROMETHAMINE 30 MG/ML
30 SYRINGE (ML) INJECTION ONCE
Refills: 0 | Status: DISCONTINUED | OUTPATIENT
Start: 2019-08-23 | End: 2019-08-23

## 2019-08-23 RX ORDER — LIDOCAINE 4 G/100G
1 CREAM TOPICAL
Qty: 1 | Refills: 0
Start: 2019-08-23 | End: 2019-08-27

## 2019-08-23 RX ORDER — METHOCARBAMOL 500 MG/1
1 TABLET, FILM COATED ORAL
Qty: 15 | Refills: 0
Start: 2019-08-23 | End: 2019-08-27

## 2019-08-23 RX ADMIN — LIDOCAINE 1 PATCH: 4 CREAM TOPICAL at 02:04

## 2019-08-23 RX ADMIN — METHOCARBAMOL 500 MILLIGRAM(S): 500 TABLET, FILM COATED ORAL at 02:05

## 2019-08-23 RX ADMIN — Medication 30 MILLIGRAM(S): at 02:05

## 2019-08-23 RX ADMIN — Medication 30 MILLIGRAM(S): at 02:35

## 2019-08-23 NOTE — ED PROVIDER NOTE - PHYSICAL EXAMINATION
+TTP L paraspinal low lumbar back, no midline tenderness, no stepoff  +SLR on L  5/5 great toe flex/ext b/l, knee flex/ext b/l  sensation intact  no edema  steady gait    R: swelling and tenderness overlying 5th metacarpal  no snuffbox tenderness  able to move all fingers

## 2019-08-23 NOTE — ED PROVIDER NOTE - DIAGNOSTIC INTERPRETATION
ER Physician: Mercedez  INTERPRETATION:  no soft tissue swelling noted; minimally displaced fracture of distal 5th metacarpal.
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
## 2019-08-23 NOTE — ED ADULT TRIAGE NOTE - HEART RATE (BEATS/MIN)
70 I will START or STAY ON the medications listed below when I get home from the hospital:    acetaminophen 325 mg oral tablet  -- 2 tab(s) by mouth every 6 hours, As needed, pain fever  -- Indication: For pain/fever    oxyCODONE 10 mg oral tablet, extended release  -- 1 tab(s) by mouth every 12 hours. Consider removing once pain is better controlled  -- Indication: For pain    oxyCODONE 10 mg oral tablet  -- 1 tab(s) by mouth every 4 hours, As needed, Moderate Pain (4 - 6). Consider reducing the dose in few days  -- Indication: For pain    oxyCODONE 15 mg oral tablet  -- 1 tab(s) by mouth every 4 hours, As needed, Severe Pain (7 - 10). Consider reducing the dose as pain/rehab improving  -- Indication: For pain    enoxaparin  -- 40 milligram(s) subcutaneous once a day 4 weeks or until fully ambulatory  -- Indication: For DVT proph    docusate sodium 100 mg oral capsule  -- 1 cap(s) by mouth 3 times a day  -- Indication: For bowel regimen    MiraLax oral powder for reconstitution  -- 17 gram(s) by mouth 2 times a day. Hold for loose BM/diarrhea  -- Indication: For bowel regimen    nicotine 21 mg/24 hr transdermal film, extended release  -- 1 patch by transdermal patch once a day  -- Indication: For smoking

## 2019-08-23 NOTE — ED ADULT TRIAGE NOTE - CHIEF COMPLAINT QUOTE
Presents to ED for worsening lower back pain and right hand swelling. Denies recent trauma to back, CP, SOB, n/v, abd pain, dysuria, numbness, tingling, incontinence.

## 2019-08-23 NOTE — ED PROVIDER NOTE - CARE PLAN
Principal Discharge DX:	Acute left-sided low back pain with left-sided sciatica  Secondary Diagnosis:	Closed displaced fracture of neck of fifth metacarpal bone of right hand, initial encounter

## 2019-08-23 NOTE — ED ADULT NURSE NOTE - NSIMPLEMENTINTERV_GEN_ALL_ED
Implemented All Universal Safety Interventions:  [place] to call system. Call bell, personal items and telephone within reach. Instruct patient to call for assistance. Room bathroom lighting operational. Non-slip footwear when patient is off stretcher. Physically safe environment: no spills, clutter or unnecessary equipment. Stretcher in lowest position, wheels locked, appropriate side rails in place.

## 2019-08-23 NOTE — ED PROVIDER NOTE - PROVIDER TOKENS
PROVIDER:[TOKEN:[[id number]]],PROVIDER:[TOKEN:[[id number]]],PROVIDER:[TOKEN:[[id number]]]
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
## 2019-08-23 NOTE — ED PROVIDER NOTE - NSFOLLOWUPINSTRUCTIONS_ED_ALL_ED_FT
Sciatica    Sciatica is pain, numbness, weakness, or tingling along the path of the sciatic nerve. The sciatic nerve starts in the lower back and runs down the back of each leg. The nerve controls the muscles in the lower leg and in the back of the knee. It also provides feeling (sensation) to the back of the thigh, the lower leg, and the sole of the foot. Sciatica is a symptom of another medical condition that pinches or puts pressure on the sciatic nerve.    Generally, sciatica only affects one side of the body. Sciatica usually goes away on its own or with treatment. In some cases, sciatica may keep coming back (recur).    What are the causes?  This condition is caused by pressure on the sciatic nerve, or pinching of the sciatic nerve. This may be the result of:  A disk in between the bones of the spine (vertebrae) bulging out too far (herniated disk).  Age-related changes in the spinal disks (degenerative disk disease).  A pain disorder that affects a muscle in the buttock (piriformis syndrome).  Extra bone growth (bone spur) near the sciatic nerve.  An injury or break (fracture) of the pelvis.  Pregnancy.  Tumor (rare).  What increases the risk?  The following factors may make you more likely to develop this condition:  Playing sports that place pressure or stress on the spine, such as football or weight lifting.  Having poor strength and flexibility.  A history of back injury.  A history of back surgery.  Sitting for long periods of time.  Doing activities that involve repetitive bending or lifting.  Obesity.  What are the signs or symptoms?  Symptoms can vary from mild to very severe, and they may include:  Any of these problems in the lower back, leg, hip, or buttock:  Mild tingling or dull aches.  Burning sensations.  Sharp pains.  Numbness in the back of the calf or the sole of the foot.  Leg weakness.  Severe back pain that makes movement difficult.  These symptoms may get worse when you cough, sneeze, or laugh, or when you sit or stand for long periods of time. Being overweight may also make symptoms worse. In some cases, symptoms may recur over time.    How is this diagnosed?  This condition may be diagnosed based on:  Your symptoms.  A physical exam. Your health care provider may ask you to do certain movements to check whether those movements trigger your symptoms.  You may have tests, including:  Blood tests.  X-rays.  MRI.  CT scan.  How is this treated?  In many cases, this condition improves on its own, without any treatment. However, treatment may include:  Reducing or modifying physical activity during periods of pain.  Exercising and stretching to strengthen your abdomen and improve the flexibility of your spine.  Icing and applying heat to the affected area.  Medicines that help:  To relieve pain and swelling.  To relax your muscles.  Injections of medicines that help to relieve pain, irritation, and inflammation around the sciatic nerve (steroids).  Surgery.  Follow these instructions at home:  Medicines     Take over-the-counter and prescription medicines only as told by your health care provider.  Do not drive or operate heavy machinery while taking prescription pain medicine.  Managing pain     If directed, apply ice to the affected area.  Put ice in a plastic bag.  Place a towel between your skin and the bag.  Leave the ice on for 20 minutes, 2–3 times a day.  After icing, apply heat to the affected area before you exercise or as often as told by your health care provider. Use the heat source that your health care provider recommends, such as a moist heat pack or a heating pad.  Place a towel between your skin and the heat source.  Leave the heat on for 20–30 minutes.  Remove the heat if your skin turns bright red. This is especially important if you are unable to feel pain, heat, or cold. You may have a greater risk of getting burned.  Activity     Return to your normal activities as told by your health care provider. Ask your health care provider what activities are safe for you.  Avoid activities that make your symptoms worse.  Take brief periods of rest throughout the day. Resting in a lying or standing position is usually better than sitting to rest.  When you rest for longer periods, mix in some mild activity or stretching between periods of rest. This will help to prevent stiffness and pain.  Avoid sitting for long periods of time without moving. Get up and move around at least one time each hour.  Exercise and stretch regularly, as told by your health care provider.  Do not lift anything that is heavier than 10 lb (4.5 kg) while you have symptoms of sciatica. When you do not have symptoms, you should still avoid heavy lifting, especially repetitive heavy lifting.  When you lift objects, always use proper lifting technique, which includes:  Bending your knees.  Keeping the load close to your body.  Avoiding twisting.  General instructions     Use good posture.  Avoid leaning forward while sitting.  Avoid hunching over while standing.  Maintain a healthy weight. Excess weight puts extra stress on your back and makes it difficult to maintain good posture.  Wear supportive, comfortable shoes. Avoid wearing high heels.  Avoid sleeping on a mattress that is too soft or too hard. A mattress that is firm enough to support your back when you sleep may help to reduce your pain.  Keep all follow-up visits as told by your health care provider. This is important.  Contact a health care provider if:  You have pain that wakes you up when you are sleeping.  You have pain that gets worse when you lie down.  Your pain is worse than you have experienced in the past.  Your pain lasts longer than 4 weeks.  You experience unexplained weight loss.  Get help right away if:  You lose control of your bowel or bladder (incontinence).  You have:  Weakness in your lower back, pelvis, buttocks, or legs that gets worse.  Redness or swelling of your back.  A burning sensation when you urinate.  This information is not intended to replace advice given to you by your health care provider. Make sure you discuss any questions you have with your health care provider.    Boxer Fracture    WHAT YOU NEED TO KNOW:    A boxer fracture is a break of a bone in your hand. This type of fracture usually happens in the bone that connects your wrist to your little finger. It can also happen in bone that connects your wrist to your ring finger. A boxer fracture occurs when you hit an object with a closed fist. The bone may be out of place or in pieces. An open fracture is when there is a break in the skin.    DISCHARGE INSTRUCTIONS:    Medicines:     Medicines may be given to decrease pain. Ask your healthcare provider how to take prescription pain medicine safely. If you have an open wound, you may also be given antibiotics or a tetanus vaccine to prevent an infection.      Take your medicine as directed. Contact your healthcare provider if you think your medicine is not helping or if you have side effects. Tell him or her if you are allergic to any medicine. Keep a list of the medicines, vitamins, and herbs you take. Include the amounts, and when and why you take them. Bring the list or the pill bottles to follow-up visits. Carry your medicine list with you in case of an emergency.    Follow up with your healthcare provider or orthopedist as directed: You may need to return for more x-rays to check bone position. Write down your questions so you remember to ask them during your visits.     Apply ice: Apply ice on your injury for 15 to 20 minutes every hour or as directed. Use an ice pack, or put crushed ice in a plastic bag. Cover it with a towel. Ice helps prevent tissue damage and decreases swelling and pain.    Elevate your hand: Elevate your hand above the level of your heart as often as you can. This will help decrease swelling and pain. Prop your hand on pillows or blankets to keep it elevated comfortably.     Go to physical therapy: A physical therapist teaches you exercises to help improve movement and strength, and to decrease pain.    Wound care: Care for your wound as directed. Carefully wash the wound with soap and water. Dry the area and put on new, clean bandages as directed. Change your bandages when they get wet or dirty.    Contact your healthcare provider or orthopedist if:     You have a fever.      Your open wound is red, swollen, or draining pus.      You have trouble moving your finger.      You have questions or concerns about your condition or care.    Return to the emergency department if:     You cannot bend or extend your finger.      You have severe pain.      You have numbness or tingling in your finger.

## 2019-08-23 NOTE — ED PROVIDER NOTE - PROGRESS NOTE DETAILS
R hand placed in ulnar gutter splint, recommend f/u with hand  I have discussed the discharge plan with the patient. The patient agrees with the plan, as discussed.  The patient understands Emergency Department diagnosis is a preliminary diagnosis often based on limited information and that the patient must adhere to the follow-up plan as discussed.  The patient understands that if the symptoms worsen or if prescribed medications do not have the desired/planned effect that the patient may return to the Emergency Department at any time for further evaluation and treatment.

## 2019-08-23 NOTE — ED ADULT NURSE NOTE - OBJECTIVE STATEMENT
Pt alert and oriented X3. Coming from home due to severe 10/10 lower back pain radiating to left lower leg unrelieved by over the counter meds. Hx sciatica. pt also states he punched something with his right hand three days ago, and since then has been hurting 7/10, unable to "lift anything". Pt has full mobility, no numbness or tingling. Mild swelling to right hand.

## 2019-08-23 NOTE — ED PROVIDER NOTE - CARE PROVIDER_API CALL
Leonardo Sandhu)  Plastic Surgery; Surgery of the Hand  [address], [address]  [place] [address]  Phone: [phone]  Fax: [phone]  Follow Up Time:     Carrillo Carpenter)  Plastic Surgery; Surgery  [address] [place] [address]  Phone: [phone]  Fax: [phone]  Follow Up Time:     Florina Waite)  Plastic Surgery  [address] [place] [address]  Phone: [phone]  Fax: [phone]  Follow Up Time:
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
## 2019-08-27 DIAGNOSIS — S62.336A DISPLACED FRACTURE OF NECK OF FIFTH METACARPAL BONE, RIGHT HAND, INITIAL ENCOUNTER FOR CLOSED FRACTURE: ICD-10-CM
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
## 2019-08-27 DIAGNOSIS — M54.2 CERVICALGIA: ICD-10-CM

## 2019-08-27 DIAGNOSIS — M54.42 LUMBAGO WITH SCIATICA, LEFT SIDE: ICD-10-CM
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
## 2019-09-30 ENCOUNTER — EMERGENCY (EMERGENCY)
Facility: HOSPITAL | Age: 54
LOS: 1 days | Discharge: HOME | End: 2019-09-30
Admitting: EMERGENCY MEDICINE

## 2019-09-30 VITALS
DIASTOLIC BLOOD PRESSURE: 82 MMHG | RESPIRATION RATE: 18 BRPM | WEIGHT: 145.06 LBS | TEMPERATURE: 98 F | SYSTOLIC BLOOD PRESSURE: 153 MMHG | OXYGEN SATURATION: 99 % | HEART RATE: 82 BPM | HEIGHT: 71 IN

## 2019-09-30 DIAGNOSIS — Z98.890 OTHER SPECIFIED POSTPROCEDURAL STATES: Chronic | ICD-10-CM

## 2019-09-30 NOTE — ED ADULT TRIAGE NOTE - ARRIVAL INFO ADDITIONAL COMMENTS
pt c/o lower back pain radiating down both his legs for 1 week.  hx of back problems that he thinks is scoliosis.

## 2019-09-30 NOTE — ED ADULT NURSE NOTE - NSIMPLEMENTINTERV_GEN_ALL_ED
Implemented All Universal Safety Interventions:  [place] to call system. Call bell, personal items and telephone within reach. Instruct patient to call for assistance. Room bathroom lighting operational. Non-slip footwear when patient is off stretcher. Physically safe environment: no spills, clutter or unnecessary equipment. Stretcher in lowest position, wheels locked, appropriate side rails in place.

## 2019-10-05 DIAGNOSIS — M54.9 DORSALGIA, UNSPECIFIED: ICD-10-CM

## 2020-09-21 NOTE — ED ADULT NURSE NOTE - IN THE PAST 12 MONTHS HAVE YOU USED DRUGS OTHER THAN THOSE REQUIRED FOR MEDICAL REASON?
[facility] EMERGENCY DEPT 
[address] [facility] [id number] [phone] Work/School Note Date: 2020 To Whom It May concern: Felipe Becker was seen and treated today in the emergency room by the following provider(s): 
Attending Provider: Geni Barker MD. Lesley Argueta mother, Jason Knowles, may return to work on 2020.  
 
Sincerely, 
 
 
 
 
Brock Cabello MD 
 
 
 
 No

## 2021-10-11 NOTE — ED PROCEDURE NOTE - CPROC ED ANATOMIC LOCATION1
Addended by: MARIA C POE on: 10/11/2021 04:14 PM     Modules accepted: Orders     5ht metacarpal/hand

## 2022-07-20 NOTE — ED ADULT NURSE NOTE - CAS ELECT INFOMATION PROVIDED
[Bilateral] : knee bilaterally [NL (0)] : extension 0 degrees [] : non-antalgic [FreeTextEntry9] : Range of motion assessed with the patient sitting on the exam table. [TWNoteComboBox7] : flexion 80 degrees DC instructions

## 2025-01-23 NOTE — ED ADULT NURSE NOTE - TEMPLATE LIST FOR HEAD TO TOE ASSESSMENT
[facility] Prior Authorization Team   Phone: [phone]    EPA REQUEST -      Abdominal Pain, N/V/D